# Patient Record
Sex: FEMALE | Race: WHITE | NOT HISPANIC OR LATINO | Employment: OTHER | URBAN - METROPOLITAN AREA
[De-identification: names, ages, dates, MRNs, and addresses within clinical notes are randomized per-mention and may not be internally consistent; named-entity substitution may affect disease eponyms.]

---

## 2021-12-05 ENCOUNTER — HOSPITAL ENCOUNTER (OUTPATIENT)
Dept: INFUSION THERAPY | Facility: HOSPITAL | Age: 46
Discharge: HOME | End: 2021-12-05
Admitting: PHYSICIAN ASSISTANT

## 2021-12-05 VITALS
SYSTOLIC BLOOD PRESSURE: 134 MMHG | DIASTOLIC BLOOD PRESSURE: 79 MMHG | OXYGEN SATURATION: 98 % | HEART RATE: 70 BPM | RESPIRATION RATE: 16 BRPM | TEMPERATURE: 97.4 F

## 2021-12-05 DIAGNOSIS — U07.1 CLINICAL DIAGNOSIS OF SEVERE ACUTE RESPIRATORY SYNDROME CORONAVIRUS 2 (SARS-COV-2) DISEASE: Primary | ICD-10-CM

## 2021-12-05 PROCEDURE — 25010000002 INJECTION, CASIRIVIMAB AND IMDEVIMAB, 1200 MG: Performed by: PHYSICIAN ASSISTANT

## 2021-12-05 PROCEDURE — M0243 CASIRIVI AND IMDEVI INFUSION: HCPCS | Performed by: PHYSICIAN ASSISTANT

## 2021-12-05 PROCEDURE — 96361 HYDRATE IV INFUSION ADD-ON: CPT

## 2021-12-05 PROCEDURE — 96365 THER/PROPH/DIAG IV INF INIT: CPT

## 2021-12-05 RX ORDER — DIPHENHYDRAMINE HYDROCHLORIDE 50 MG/ML
50 INJECTION INTRAMUSCULAR; INTRAVENOUS ONCE AS NEEDED
Status: CANCELLED | OUTPATIENT
Start: 2021-12-05

## 2021-12-05 RX ORDER — EPINEPHRINE 1 MG/ML
0.3 INJECTION, SOLUTION, CONCENTRATE INTRAVENOUS AS NEEDED
Status: DISCONTINUED | OUTPATIENT
Start: 2021-12-05 | End: 2021-12-07 | Stop reason: HOSPADM

## 2021-12-05 RX ORDER — METHYLPREDNISOLONE SODIUM SUCCINATE 125 MG/2ML
125 INJECTION, POWDER, LYOPHILIZED, FOR SOLUTION INTRAMUSCULAR; INTRAVENOUS AS NEEDED
Status: CANCELLED | OUTPATIENT
Start: 2021-12-05

## 2021-12-05 RX ORDER — SODIUM CHLORIDE 9 MG/ML
30 INJECTION, SOLUTION INTRAVENOUS ONCE
Status: COMPLETED | OUTPATIENT
Start: 2021-12-05 | End: 2021-12-05

## 2021-12-05 RX ORDER — DIPHENHYDRAMINE HCL 25 MG
50 TABLET ORAL ONCE AS NEEDED
Status: CANCELLED | OUTPATIENT
Start: 2021-12-05

## 2021-12-05 RX ORDER — DIPHENHYDRAMINE HCL 25 MG
50 CAPSULE ORAL ONCE AS NEEDED
Status: CANCELLED | OUTPATIENT
Start: 2021-12-05

## 2021-12-05 RX ORDER — DIPHENHYDRAMINE HCL 25 MG
50 CAPSULE ORAL ONCE AS NEEDED
Status: DISCONTINUED | OUTPATIENT
Start: 2021-12-05 | End: 2021-12-07 | Stop reason: HOSPADM

## 2021-12-05 RX ORDER — DIPHENHYDRAMINE HYDROCHLORIDE 50 MG/ML
50 INJECTION INTRAMUSCULAR; INTRAVENOUS ONCE AS NEEDED
Status: DISCONTINUED | OUTPATIENT
Start: 2021-12-05 | End: 2021-12-07 | Stop reason: HOSPADM

## 2021-12-05 RX ORDER — IBUPROFEN 200 MG
800 TABLET ORAL DAILY
COMMUNITY
End: 2023-02-20

## 2021-12-05 RX ORDER — EPINEPHRINE 1 MG/ML
0.3 INJECTION, SOLUTION, CONCENTRATE INTRAVENOUS AS NEEDED
Status: CANCELLED | OUTPATIENT
Start: 2021-12-05

## 2021-12-05 RX ORDER — FLUTICASONE PROPIONATE 50 MCG
2 SPRAY, SUSPENSION (ML) NASAL DAILY
COMMUNITY

## 2021-12-05 RX ORDER — SODIUM CHLORIDE 9 MG/ML
30 INJECTION, SOLUTION INTRAVENOUS ONCE
Status: CANCELLED | OUTPATIENT
Start: 2021-12-05

## 2021-12-05 RX ORDER — CETIRIZINE HYDROCHLORIDE 10 MG/1
10 TABLET ORAL DAILY
COMMUNITY

## 2021-12-05 RX ORDER — EPINEPHRINE 1 MG/ML
0.3 INJECTION, SOLUTION INTRAMUSCULAR; SUBCUTANEOUS AS NEEDED
Status: CANCELLED | OUTPATIENT
Start: 2021-12-05

## 2021-12-05 RX ORDER — METHYLPREDNISOLONE SODIUM SUCCINATE 125 MG/2ML
125 INJECTION, POWDER, LYOPHILIZED, FOR SOLUTION INTRAMUSCULAR; INTRAVENOUS AS NEEDED
Status: DISCONTINUED | OUTPATIENT
Start: 2021-12-05 | End: 2021-12-07 | Stop reason: HOSPADM

## 2021-12-05 RX ADMIN — CASIRIVIMAB AND IMDEVIMAB 1200 MG: 600; 600 INJECTION, SOLUTION, CONCENTRATE INTRAVENOUS at 12:17

## 2021-12-05 RX ADMIN — SODIUM CHLORIDE 100 ML: 9 INJECTION, SOLUTION INTRAVENOUS at 12:39

## 2021-12-11 ENCOUNTER — HOSPITAL ENCOUNTER (EMERGENCY)
Facility: HOSPITAL | Age: 46
Discharge: HOME | End: 2021-12-11
Attending: EMERGENCY MEDICINE | Admitting: EMERGENCY MEDICINE

## 2021-12-11 VITALS
HEIGHT: 72 IN | OXYGEN SATURATION: 98 % | WEIGHT: 276.46 LBS | DIASTOLIC BLOOD PRESSURE: 68 MMHG | RESPIRATION RATE: 22 BRPM | HEART RATE: 83 BPM | BODY MASS INDEX: 37.45 KG/M2 | TEMPERATURE: 96.8 F | SYSTOLIC BLOOD PRESSURE: 132 MMHG

## 2021-12-11 DIAGNOSIS — S39.012A STRAIN OF LUMBAR REGION, INITIAL ENCOUNTER: Primary | ICD-10-CM

## 2021-12-11 LAB
BILIRUB UR QL STRIP: ABNORMAL
CLARITY UR: ABNORMAL
COLOR UR: ABNORMAL
GLUCOSE UR STRIP-MCNC: NEGATIVE MG/DL
HGB UR QL STRIP.AUTO: NEGATIVE
KETONES UR QL STRIP: ABNORMAL
LEUKOCYTE ESTERASE UR QL STRIP.AUTO: NEGATIVE
NITRITE UR QL STRIP: NEGATIVE
PH UR STRIP.AUTO: 5.5 [PH] (ref 5–8)
PROT UR QL STRIP: ABNORMAL
SP GR UR STRIP: 1.03 (ref 1–1.03)
UROBILINOGEN UR QL STRIP: ABNORMAL

## 2021-12-11 PROCEDURE — 81003 URINALYSIS AUTO W/O SCOPE: CPT | Performed by: EMERGENCY MEDICINE

## 2021-12-11 PROCEDURE — 99283 EMERGENCY DEPT VISIT LOW MDM: CPT

## 2021-12-11 RX ORDER — CYCLOBENZAPRINE HCL 10 MG
10 TABLET ORAL 3 TIMES DAILY PRN
Qty: 15 TABLET | Refills: 0 | Status: SHIPPED | OUTPATIENT
Start: 2021-12-11 | End: 2023-02-20

## 2021-12-11 RX ORDER — NAPROXEN 375 MG/1
375 TABLET ORAL 2 TIMES DAILY PRN
Qty: 14 TABLET | Refills: 0 | Status: SHIPPED | OUTPATIENT
Start: 2021-12-11 | End: 2023-02-20

## 2021-12-11 NOTE — ED NOTES
Pt reports last night she began hurting underneath her left shoulder blade and today she is hurting every time she breathes. Pt has taken a breathing treatment (albuterol) and ibuprofen. Pt describes pain as stabbing pain and rates the pain 6/10.     Bethany English RN  12/11/21 0125

## 2021-12-11 NOTE — ED NOTES
Pt reports she was tested positive for COVID last Wednesday but her symptoms began 11/30/2021. Pt received regeneron last Sunday.      Bethany English RN  12/11/21 2745

## 2021-12-11 NOTE — ED PROVIDER NOTES
Subjective   Patient is a 46-year-old female complaining of left-sided back pain.  This developed over the past 24 hours.  The pain is worse in certain motions and positions.  She denies recent trauma or other complaint          Review of Systems  Negative cough fever chest pain shortness of breath vomiting diarrhea dysuria or other complaint  Past Medical History:   Diagnosis Date   • Closed right ankle fracture 2019    no surgery   • COVID 11/2021   • Elbow fracture, right     no surgery   • Left patella fracture     as a kid;no surgery       No Known Allergies    Past Surgical History:   Procedure Laterality Date   • NO PAST SURGERIES         Family History   Problem Relation Age of Onset   • Hypertension Mother    • Hypertension Father    • Diabetes Father        Social History     Socioeconomic History   • Marital status:    Tobacco Use   • Smoking status: Current Every Day Smoker     Years: 26.00     Types: Cigarettes   • Smokeless tobacco: Never Used   Vaping Use   • Vaping Use: Never used   Substance and Sexual Activity   • Alcohol use: Yes     Comment: socially all of the above   • Drug use: Never   • Sexual activity: Defer           Objective   Physical Exam  Lungs are clear.  Heart has regular rhythm without murmur gallop.  Chest is nontender.  Abdomen soft nontender.  Back has pain to palpation over her left flank.  She has no spinous tenderness.  She has full range of motion with pain.  Neurologic exam is nonfocal.  Procedures           ED Course      Results for orders placed or performed during the hospital encounter of 12/11/21   Urinalysis With Microscopic If Indicated (No Culture) - Urine, Clean Catch    Specimen: Urine, Clean Catch   Result Value Ref Range    Color, UA Dark Yellow (A) Yellow, Straw    Appearance, UA Hazy (A) Clear    pH, UA 5.5 5.0 - 8.0    Specific Gravity, UA 1.034 (H) 1.005 - 1.030    Glucose, UA Negative Negative    Ketones, UA Trace (A) Negative    Bilirubin, UA Small  (1+) (A) Negative    Blood, UA Negative Negative    Protein, UA Trace (A) Negative    Leuk Esterase, UA Negative Negative    Nitrite, UA Negative Negative    Urobilinogen, UA 0.2 E.U./dL 0.2 - 1.0 E.U./dL                                                MDM  Number of Diagnoses or Management Options  Diagnosis management comments: Patient has findings consistent with back strain.  Patient will be discharged with a prescription for Naprosyn and Flexeril.  Use a heating pad.  She will see MD for recheck as needed.       Amount and/or Complexity of Data Reviewed  Clinical lab tests: reviewed    Risk of Complications, Morbidity, and/or Mortality  Presenting problems: moderate  Diagnostic procedures: moderate  Management options: moderate    Patient Progress  Patient progress: stable      Final diagnoses:   Strain of lumbar region, initial encounter       ED Disposition  ED Disposition     ED Disposition Condition Comment    Discharge Stable           No follow-up provider specified.       Medication List      New Prescriptions    cyclobenzaprine 10 MG tablet  Commonly known as: FLEXERIL  Take 1 tablet by mouth 3 (Three) Times a Day As Needed for Muscle Spasms for up to 15 doses.     naproxen 375 MG tablet  Commonly known as: NAPROSYN  Take 1 tablet by mouth 2 (Two) Times a Day As Needed for Moderate Pain .           Where to Get Your Medications      These medications were sent to [facility]/pharmacy #[id number] - [facility] [place] - [address] [facility] [phone] [place] [phone]   [address] [facility] [place] [address]    Hours: 24-hours Phone: [phone]   · cyclobenzaprine 10 MG tablet  · naproxen 375 MG tablet          Ravin Kline MD  12/11/21 [id number]

## 2022-09-26 DIAGNOSIS — G47.33 SEVERE OBSTRUCTIVE SLEEP APNEA: Primary | ICD-10-CM

## 2023-01-10 ENCOUNTER — OFFICE VISIT (OUTPATIENT)
Dept: CARDIOLOGY | Facility: CLINIC | Age: 48
End: 2023-01-10
Payer: MEDICAID

## 2023-01-10 VITALS
HEIGHT: 72 IN | WEIGHT: 284 LBS | SYSTOLIC BLOOD PRESSURE: 138 MMHG | OXYGEN SATURATION: 98 % | HEART RATE: 82 BPM | BODY MASS INDEX: 38.47 KG/M2 | DIASTOLIC BLOOD PRESSURE: 73 MMHG

## 2023-01-10 DIAGNOSIS — G47.33 OBSTRUCTIVE SLEEP APNEA: ICD-10-CM

## 2023-01-10 DIAGNOSIS — E78.00 PURE HYPERCHOLESTEROLEMIA: ICD-10-CM

## 2023-01-10 DIAGNOSIS — E11.9 TYPE 2 DIABETES MELLITUS WITHOUT COMPLICATION, WITHOUT LONG-TERM CURRENT USE OF INSULIN: ICD-10-CM

## 2023-01-10 DIAGNOSIS — R07.2 PRECORDIAL PAIN: Primary | ICD-10-CM

## 2023-01-10 DIAGNOSIS — I10 PRIMARY HYPERTENSION: ICD-10-CM

## 2023-01-10 PROCEDURE — 99204 OFFICE O/P NEW MOD 45 MIN: CPT | Performed by: INTERNAL MEDICINE

## 2023-01-10 PROCEDURE — 93000 ELECTROCARDIOGRAM COMPLETE: CPT | Performed by: INTERNAL MEDICINE

## 2023-01-10 RX ORDER — METOPROLOL SUCCINATE 25 MG/1
25 TABLET, EXTENDED RELEASE ORAL DAILY
COMMUNITY

## 2023-01-10 RX ORDER — HYDROCHLOROTHIAZIDE 12.5 MG/1
12.5 CAPSULE, GELATIN COATED ORAL DAILY
COMMUNITY
End: 2023-02-20

## 2023-01-10 RX ORDER — ATORVASTATIN CALCIUM 10 MG/1
10 TABLET, FILM COATED ORAL DAILY
COMMUNITY

## 2023-01-10 NOTE — PROGRESS NOTES
Subjective:     Encounter Date:01/10/2023      Patient ID: Мария Fajardo is a 47 y.o. female.    Chief Complaint:  History of Present Illness 47-year-old white female with newly diagnosed hypertension hyperlipidemia and diabetes and sleep apnea presents to my office for a new consultation.  Patient has been having symptoms of chest pain which she describes as a sharp pain occurring on the left side of the chest without radiation.  No complains of any shortness of breath PND orthopnea.  No palpitation dizziness syncope she has no swelling of the feet.  She is taking her meds regular but she is beginning to exercise and follow a good diet.  She does not smoke anymore.  She has family history of coronary disease.  /73   Pulse 82   Ht 182.9 cm (72.01\")   Wt 129 kg (284 lb)   SpO2 98%   BMI 38.51 kg/m²     The following portions of the patient's history were reviewed and updated as appropriate: allergies, current medications, past family history, past medical history, past social history, past surgical history and problem list.  Past Medical History:   Diagnosis Date   • Closed right ankle fracture 2019    no surgery   • COVID 11/2021   • Elbow fracture, right     no surgery   • Hyperlipidemia December 2022   • Hypertension Recently started taking medications   • Left patella fracture     as a kid;no surgery   • Sleep apnea July 2022     Past Surgical History:   Procedure Laterality Date   • NO PAST SURGERIES       Social History     Socioeconomic History   • Marital status:    Tobacco Use   • Smoking status: Former     Packs/day: 0.25     Years: 26.00     Pack years: 6.50     Types: Cigarettes   • Smokeless tobacco: Never   • Tobacco comments:     Social drinker/smoker   Vaping Use   • Vaping Use: Never used   Substance and Sexual Activity   • Alcohol use: Yes     Alcohol/week: 4.0 standard drinks     Types: 2 Cans of beer, 2 Shots of liquor per week     Comment: socially all of the above   • Drug  use: Never   • Sexual activity: Yes     Partners: Male     Birth control/protection: None     Family History   Problem Relation Age of Onset   • Hypertension Mother         High Blood Pressure   • Hypertension Father         High Blood Pressure   • Diabetes Father    • Heart disease Father    • Fainting Sister        Current Outpatient Medications:   •  atorvastatin (LIPITOR) 10 MG tablet, Take 10 mg by mouth Daily., Disp: , Rfl:   •  cetirizine (zyrTEC) 10 MG tablet, Take 10 mg by mouth Daily., Disp: , Rfl:   •  fluticasone (FLONASE) 50 MCG/ACT nasal spray, 2 sprays into the nostril(s) as directed by provider Daily., Disp: , Rfl:   •  hydroCHLOROthiazide (MICROZIDE) 12.5 MG capsule, Take 12.5 mg by mouth Daily., Disp: , Rfl:   •  ibuprofen (ADVIL,MOTRIN) 200 MG tablet, Take 800 mg by mouth Daily., Disp: , Rfl:   •  metFORMIN (GLUCOPHAGE) 500 MG tablet, Take 500 mg by mouth 2 (Two) Times a Day With Meals., Disp: , Rfl:   •  metoprolol succinate XL (TOPROL-XL) 25 MG 24 hr tablet, Take 25 mg by mouth Daily., Disp: , Rfl:   •  naproxen (NAPROSYN) 375 MG tablet, Take 1 tablet by mouth 2 (Two) Times a Day As Needed for Moderate Pain ., Disp: 14 tablet, Rfl: 0  •  cyclobenzaprine (FLEXERIL) 10 MG tablet, Take 1 tablet by mouth 3 (Three) Times a Day As Needed for Muscle Spasms for up to 15 doses., Disp: 15 tablet, Rfl: 0  No Known Allergies    Review of Systems   Constitutional: Negative for malaise/fatigue.   Cardiovascular: Positive for chest pain. Negative for dyspnea on exertion, leg swelling and palpitations.   Respiratory: Negative for cough and shortness of breath.    Gastrointestinal: Negative for abdominal pain, nausea and vomiting.   Neurological: Negative for dizziness, focal weakness, headaches, light-headedness and numbness.   All other systems reviewed and are negative.             Objective:     Constitutional:       Appearance: Well-developed.   Eyes:      General: No scleral icterus.      Conjunctiva/sclera: Conjunctivae normal.      Pupils: Pupils are equal, round, and reactive to light.   HENT:      Head: Normocephalic and atraumatic.   Neck:      Vascular: No carotid bruit or JVD.   Pulmonary:      Effort: Pulmonary effort is normal.      Breath sounds: Normal breath sounds. No wheezing. No rales.   Cardiovascular:      Normal rate. Regular rhythm.   Pulses:     Intact distal pulses.   Abdominal:      General: Bowel sounds are normal.      Palpations: Abdomen is soft.   Musculoskeletal: Normal range of motion.      Cervical back: Normal range of motion and neck supple. Skin:     General: Skin is warm and dry.      Findings: No rash.   Neurological:      Mental Status: Alert.      Comments: No focal deficits           ECG 12 Lead    Date/Time: 1/10/2023 12:55 PM  Performed by: Chencho Up MD  Authorized by: Chencho Up MD   Comments: Normal sinus rhythm  Normal EKG  No previous ECGs available            Lab Review:       Assessment:          Diagnosis Plan   1. Precordial pain        2. Primary hypertension        3. Pure hypercholesterolemia        4. Type 2 diabetes mellitus without complication, without long-term current use of insulin (HCC)        5. Obstructive sleep apnea               Plan:     Patient presented with chest pain which is atypical for angina.  Patient has an EKG which is normal  However patient has significant risk factors including hypertension hyperlipidemia diabetes and previous smoker and family stay of coronary disease  Patient will have an echocardiogram for LV function valvular abnormalities  We will also have a treadmill stress test to rule out any ischemia  Patient is also advised to stop smoking and not restarted and also exercise regularly  Patient's blood pressure Norman stable.

## 2023-01-23 ENCOUNTER — OFFICE VISIT (OUTPATIENT)
Dept: PODIATRY | Facility: CLINIC | Age: 48
End: 2023-01-23
Payer: MEDICAID

## 2023-01-23 VITALS — HEIGHT: 72 IN | BODY MASS INDEX: 38.47 KG/M2 | HEART RATE: 79 BPM | OXYGEN SATURATION: 98 % | WEIGHT: 284 LBS

## 2023-01-23 DIAGNOSIS — M21.6X2 ACQUIRED EQUINUS DEFORMITY OF BOTH FEET: ICD-10-CM

## 2023-01-23 DIAGNOSIS — M79.671 BILATERAL FOOT PAIN: Primary | ICD-10-CM

## 2023-01-23 DIAGNOSIS — M72.2 PLANTAR FASCIITIS, BILATERAL: ICD-10-CM

## 2023-01-23 DIAGNOSIS — M79.672 BILATERAL FOOT PAIN: Primary | ICD-10-CM

## 2023-01-23 DIAGNOSIS — M21.6X1 ACQUIRED EQUINUS DEFORMITY OF BOTH FEET: ICD-10-CM

## 2023-01-23 PROCEDURE — 20550 NJX 1 TENDON SHEATH/LIGAMENT: CPT | Performed by: PODIATRIST

## 2023-01-23 PROCEDURE — 76942 ECHO GUIDE FOR BIOPSY: CPT | Performed by: PODIATRIST

## 2023-01-23 PROCEDURE — 99203 OFFICE O/P NEW LOW 30 MIN: CPT | Performed by: PODIATRIST

## 2023-01-23 RX ORDER — TRIAMCINOLONE ACETONIDE 40 MG/ML
20 INJECTION, SUSPENSION INTRA-ARTICULAR; INTRAMUSCULAR ONCE
Status: COMPLETED | OUTPATIENT
Start: 2023-01-23 | End: 2023-01-23

## 2023-01-23 RX ADMIN — TRIAMCINOLONE ACETONIDE 20 MG: 40 INJECTION, SUSPENSION INTRA-ARTICULAR; INTRAMUSCULAR at 09:59

## 2023-01-23 NOTE — PROGRESS NOTES
01/23/2023  Foot and Ankle Surgery - New Patient   Provider: Dr. Rigoberto Hess DPM  Location: [facility]    Subjective:  Мария Fajardo is a 47 y.o. female.     Chief Complaint   Patient presents with   • Left Foot - Pain   • Right Foot - Pain, Heel Spurs   • Initial Evaluation     MARI Matson last seen approx. 1 month ago       HPI: The patient is a 47-year-old female who presents to the clinic for bilateral foot pain.    She reports her right foot is worse than her left foot. The patient states she has had this issue for approximately 2 years. She notes she saw [facility] approximately 1 year ago and received a series of cortisone injections. They removed an ingrown toenail and her toenail fell off. She is concerned about heel spurs. The patient denies any known injuries; however, states she has fallen and fractured her foot a few times. She notes she was given a brace to wear; however, she was not happy with their service. The patient reports she has had to crawl to the bathroom due to the pain. She states her pain is relieved if she takes the pressure off her feet. 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 She confirms that she is on her feet all day. The patient notes she would like to try conservative treatment at this time. She reports that she went to [employer], and purchased PowerStep insoles. The patient notes that she usually wears her boots instead of her tennis shoes.     The patient denies being referred.    No Known Allergies    Past Medical History:   Diagnosis Date   • Closed right ankle fracture 2019    no surgery   • COVID 11/2021   • Elbow fracture, right     no surgery   • Hyperlipidemia December 2022   • Hypertension Recently started taking medications   • Left patella fracture     as a kid;no surgery   • Sleep apnea July 2022       Past Surgical History:   Procedure Laterality Date   • NO PAST SURGERIES          Family History   Problem Relation Age of Onset   • Hypertension Mother         High Blood Pressure   • Hypertension Father         High Blood Pressure   • Diabetes Father    • Heart disease Father    • Fainting Sister        Social History     Socioeconomic History   • Marital status:    Tobacco Use   • Smoking status: Former     Packs/day: 0.25     Years: 26.00     Pack years: 6.50     Types: Cigarettes   • Smokeless tobacco: Never   • Tobacco comments:     Social drinker/smoker   Vaping Use   • Vaping Use: Never used   Substance and Sexual Activity   • Alcohol use: Yes     Alcohol/week: 4.0 standard drinks     Types: 2 Cans of beer, 2 Shots of liquor per week     Comment: socially all of the above   • Drug use: Never   • Sexual activity: Yes     Partners: Male     Birth control/protection: None        Current Outpatient Medications on File Prior to Visit   Medication Sig Dispense Refill   • atorvastatin (LIPITOR) 10 MG tablet Take 10 mg by mouth Daily.     • cetirizine (zyrTEC) 10 MG tablet Take 10 mg by mouth Daily.     • fluticasone (FLONASE) 50 MCG/ACT nasal spray 2 sprays into the nostril(s) as directed by provider Daily.     • hydroCHLOROthiazide (MICROZIDE) 12.5 MG capsule Take 12.5 mg by mouth Daily.     • ibuprofen (ADVIL,MOTRIN) 200 MG tablet Take 800 mg by mouth Daily.     • metFORMIN (GLUCOPHAGE) 500 MG tablet Take 500 mg by mouth 2 (Two) Times a Day With Meals.     • metoprolol succinate XL (TOPROL-XL) 25 MG 24 hr tablet Take 25 mg by mouth Daily.     • cyclobenzaprine (FLEXERIL) 10 MG tablet Take 1 tablet by mouth 3 (Three) Times a Day As Needed for Muscle Spasms for up to 15 doses. 15 tablet 0   • naproxen (NAPROSYN) 375 MG tablet Take 1 tablet by mouth 2 (Two) Times a Day As Needed for Moderate Pain . 14 tablet 0     No current facility-administered medications on file prior to visit.       Review of Systems:  General: Denies fever, chills, fatigue, and weakness.  Eyes: Denies vision  "loss, blurry vision, and excessive redness.  ENT: Denies hearing issues and difficulty swallowing.  Cardiovascular: Denies palpitations, chest pain, or syncopal episodes.  Respiratory: Denies shortness of breath, wheezing, and coughing.  GI: Denies abdominal pain, nausea, and vomiting.   : Denies frequency, hematuria, and urgency.  Musculoskeletal: Denies muscle cramps, joint pains, and stiffness.  Derm: Denies rash, open wounds, or suspicious lesions.  Neuro: Denies headaches, numbness, loss of coordination, and tremors.  Psych: Denies anxiety and depression.  Endocrine: Denies temperature intolerance and changes in appetite.  Heme: Denies bleeding disorders or abnormal bruising.     Objective   Pulse 79   Ht 182.9 cm (72\")   Wt 129 kg (284 lb)   SpO2 98%   BMI 38.52 kg/m²     Foot/Ankle Exam:       General:   Appearance: appears stated age and healthy    Orientation: AAOx3    Affect: appropriate      VASCULAR      Right Foot Vascularity   Normal vascular exam    Dorsalis pedis:  2+  Posterior tibial:  2+  Skin Temperature: warm    Edema Grading:  None  CFT:  < 3 seconds  Pedal Hair Growth:  Present  Varicosities: none       Left Foot Vascularity   Normal vascular exam    Dorsalis pedis:  2+  Posterior tibial:  2+  Skin Temperature: warm    Edema Grading:  None  CFT:  < 3 seconds  Pedal Hair Growth:  Present  Varicosities: none        NEUROLOGIC     Right Foot Neurologic   Light touch sensation:  Normal  Hot/Cold sensation: normal    Achilles reflex:  2+     Left Foot Neurologic   Light touch sensation:  Normal  Hot/cold sensation: normal    Achilles reflex:  2+     MUSCULOSKELETAL      Right Foot Musculoskeletal   Ecchymosis:  None  Arch:  Normal     Left Foot Musculoskeletal   Ecchymosis:  None  Arch:  Normal     MUSCLE STRENGTH     Right Foot Muscle Strength   Normal strength    Foot dorsiflexion:  5  Foot plantar flexion:  5  Foot inversion:  5  Foot eversion:  5     Left Foot Muscle Strength   Normal " strength    Foot dorsiflexion:  5  Foot plantar flexion:  5  Foot inversion:  5  Foot eversion:  5     DERMATOLOGIC     Right Foot Dermatologic   Skin: skin intact    Nails comment:  Nails 1-5     Left Foot Dermatologic   Skin: skin intact    Nails comment:  Nails 1-5     TESTS     Right Foot Tests   Anterior drawer: negative    Varus tilt: negative       Left Foot Tests   Anterior drawer: negative    Varus tilt: negative        Right Foot Additional Comments Moderate to significant discomfort with palpation involving the plantar medial calcaneal tuberosity, right greater than left. Moderate equinus contracture with knee extended and flexed. No gross deformity or instability.      Assessment & Plan   Diagnoses and all orders for this visit:    1. Bilateral foot pain (Primary)    2. Plantar fasciitis, bilateral  -     XR Foot 3+ View Bilateral  -     triamcinolone acetonide (KENALOG-40) injection 20 mg    3. Acquired equinus deformity of both feet  -     XR Foot 3+ View Bilateral  -     triamcinolone acetonide (KENALOG-40) injection 20 mg        Patient presents for bilateral foot pain. Imaging was independently reviewed showing heel spur. We discussed imaging, diagnosis, and treatment options at length. I have asked her to wear PowerStep inserts. I have advised her to wear tennis shoes every day. I have asked her to avoid ambulating while barefoot, as well as avoiding wearing flip flops, sandals, flats, or heels. I have asked her to perform stretching exercises 2 to 3 times daily. We discussed surgical intervention. The patient would like to proceed with bilateral plantar fascia injections today.    Greater than 30 minutes spent before, during, and after evaluation for patient care.    Plantar Fascia Steroid Injection: Bilateral feet    Consent and time out was performed before proceeding with the procedure. The area of maximal tenderness was palpated near the plantar medial calcaneal tuberosity of the bilateral  feet. The area was cleansed with alcohol. Under ultrasound guidance, the plantar fascia was visualized and a solution totaling 1.5 mL was injected about the origin of the plantar fascia. The solution contained 0.5 mL of 1% lidocaine plain, 0.5 mL of 0.5% Marcaine plain, and 0.5 mL of Kenalog. After the injection, the patient noted immediate pain relief. Mild compression was placed at the injection site followed by a sterile bandage. The patient tolerated the injection well without complication.    Orders Placed This Encounter   Procedures   • XR Foot 3+ View Bilateral     Order Specific Question:   Reason for Exam:     Answer:   horacio heel pain and heel spur in right foot       room 15      wb     Order Specific Question:   Patient Pregnant     Answer:   No     Order Specific Question:   Does this patient have a diabetic monitoring/medication delivering device on?     Answer:   No     Order Specific Question:   Release to patient     Answer:   Routine Release        Note is dictated utilizing voice recognition software. Unfortunately this leads to occasional typographical errors. I apologize in advance if the situation occurs. If questions occur please do not hesitate to call our office.    Transcribed from ambient dictation for JOSIE Hess DPM by Dorothy Jewell.  01/23/23   12:52 EST    Patient or patient representative verbalized consent to the visit recording.  I have personally performed the services described in this document as transcribed by the above individual, and it is both accurate and complete.

## 2023-01-25 ENCOUNTER — HOSPITAL ENCOUNTER (OUTPATIENT)
Dept: CARDIOLOGY | Facility: HOSPITAL | Age: 48
Discharge: HOME | End: 2023-01-25
Payer: MEDICAID

## 2023-01-25 DIAGNOSIS — E11.9 TYPE 2 DIABETES MELLITUS WITHOUT COMPLICATION, WITHOUT LONG-TERM CURRENT USE OF INSULIN: ICD-10-CM

## 2023-01-25 DIAGNOSIS — I10 PRIMARY HYPERTENSION: ICD-10-CM

## 2023-01-25 DIAGNOSIS — E78.00 PURE HYPERCHOLESTEROLEMIA: ICD-10-CM

## 2023-01-25 DIAGNOSIS — G47.33 OBSTRUCTIVE SLEEP APNEA: ICD-10-CM

## 2023-01-25 DIAGNOSIS — R07.2 PRECORDIAL PAIN: ICD-10-CM

## 2023-01-25 LAB
BH CV STRESS BP STAGE 1: NORMAL
BH CV STRESS BP STAGE 2: NORMAL
BH CV STRESS DURATION MIN STAGE 1: 3
BH CV STRESS DURATION MIN STAGE 2: 3
BH CV STRESS DURATION MIN STAGE 3: 3
BH CV STRESS DURATION SEC STAGE 1: 0
BH CV STRESS DURATION SEC STAGE 2: 0
BH CV STRESS DURATION SEC STAGE 3: 0
BH CV STRESS GRADE STAGE 1: 10
BH CV STRESS GRADE STAGE 2: 12
BH CV STRESS GRADE STAGE 3: 14
BH CV STRESS HR STAGE 1: 111
BH CV STRESS HR STAGE 2: 127
BH CV STRESS HR STAGE 3: 146
BH CV STRESS METS STAGE 1: 5
BH CV STRESS METS STAGE 2: 7.5
BH CV STRESS METS STAGE 3: 10
BH CV STRESS PROTOCOL 1: NORMAL
BH CV STRESS RECOVERY BP: NORMAL MMHG
BH CV STRESS RECOVERY HR: 100 BPM
BH CV STRESS SPEED STAGE 1: 1.7
BH CV STRESS SPEED STAGE 2: 2.5
BH CV STRESS SPEED STAGE 3: 3.4
BH CV STRESS STAGE 1: 1
BH CV STRESS STAGE 2: 2
BH CV STRESS STAGE 3: 3
MAXIMAL PREDICTED HEART RATE: 172 BPM
STRESS BASELINE BP: NORMAL MMHG
STRESS BASELINE HR: 67 BPM
STRESS POST EXERCISE DUR MIN: 8 MIN
STRESS POST EXERCISE DUR SEC: 2 SEC
STRESS TARGET HR: 146 BPM

## 2023-01-25 PROCEDURE — 93017 CV STRESS TEST TRACING ONLY: CPT

## 2023-01-25 PROCEDURE — 93306 TTE W/DOPPLER COMPLETE: CPT | Performed by: INTERNAL MEDICINE

## 2023-01-25 PROCEDURE — 93018 CV STRESS TEST I&R ONLY: CPT | Performed by: INTERNAL MEDICINE

## 2023-01-25 PROCEDURE — 93306 TTE W/DOPPLER COMPLETE: CPT

## 2023-01-25 PROCEDURE — 93016 CV STRESS TEST SUPVJ ONLY: CPT | Performed by: NURSE PRACTITIONER

## 2023-01-26 LAB
BH CV ECHO MEAS - ACS: 2.26 CM
BH CV ECHO MEAS - AO MAX PG: 8.4 MMHG
BH CV ECHO MEAS - AO MEAN PG: 4 MMHG
BH CV ECHO MEAS - AO ROOT DIAM: 3.4 CM
BH CV ECHO MEAS - AO V2 MAX: 144.5 CM/SEC
BH CV ECHO MEAS - AO V2 VTI: 27.6 CM
BH CV ECHO MEAS - AVA(I,D): 2.7 CM2
BH CV ECHO MEAS - EDV(CUBED): 91.9 ML
BH CV ECHO MEAS - EDV(MOD-SP4): 119.5 ML
BH CV ECHO MEAS - EF(MOD-SP4): 52.5 %
BH CV ECHO MEAS - ESV(CUBED): 32.5 ML
BH CV ECHO MEAS - ESV(MOD-SP4): 56.8 ML
BH CV ECHO MEAS - FS: 29.2 %
BH CV ECHO MEAS - IVS/LVPW: 0.86 CM
BH CV ECHO MEAS - IVSD: 0.86 CM
BH CV ECHO MEAS - LA DIMENSION: 3.7 CM
BH CV ECHO MEAS - LV DIASTOLIC VOL/BSA (35-75): 48.3 CM2
BH CV ECHO MEAS - LV MASS(C)D: 140.3 GRAMS
BH CV ECHO MEAS - LV MAX PG: 4.9 MMHG
BH CV ECHO MEAS - LV MEAN PG: 2.25 MMHG
BH CV ECHO MEAS - LV SYSTOLIC VOL/BSA (12-30): 23 CM2
BH CV ECHO MEAS - LV V1 MAX: 110.1 CM/SEC
BH CV ECHO MEAS - LV V1 VTI: 22 CM
BH CV ECHO MEAS - LVIDD: 4.5 CM
BH CV ECHO MEAS - LVIDS: 3.2 CM
BH CV ECHO MEAS - LVOT AREA: 3.4 CM2
BH CV ECHO MEAS - LVOT DIAM: 2.07 CM
BH CV ECHO MEAS - LVPWD: 1.01 CM
BH CV ECHO MEAS - MV A MAX VEL: 94.7 CM/SEC
BH CV ECHO MEAS - MV DEC SLOPE: 326.2 CM/SEC2
BH CV ECHO MEAS - MV DEC TIME: 0.27 MSEC
BH CV ECHO MEAS - MV E MAX VEL: 86.6 CM/SEC
BH CV ECHO MEAS - MV E/A: 0.91
BH CV ECHO MEAS - MV MAX PG: 4 MMHG
BH CV ECHO MEAS - MV MEAN PG: 1.77 MMHG
BH CV ECHO MEAS - MV V2 VTI: 26.6 CM
BH CV ECHO MEAS - MVA(VTI): 2.8 CM2
BH CV ECHO MEAS - PA ACC TIME: 0.14 SEC
BH CV ECHO MEAS - PA PR(ACCEL): 16.7 MMHG
BH CV ECHO MEAS - PULM A REVS DUR: 0.1 SEC
BH CV ECHO MEAS - PULM A REVS VEL: 32.7 CM/SEC
BH CV ECHO MEAS - PULM DIAS VEL: 50.8 CM/SEC
BH CV ECHO MEAS - PULM S/D: 1.22
BH CV ECHO MEAS - PULM SYS VEL: 61.8 CM/SEC
BH CV ECHO MEAS - RAP SYSTOLE: 8 MMHG
BH CV ECHO MEAS - RV MAX PG: 1.78 MMHG
BH CV ECHO MEAS - RV V1 MAX: 66.7 CM/SEC
BH CV ECHO MEAS - RV V1 VTI: 16 CM
BH CV ECHO MEAS - RVDD: 3.5 CM
BH CV ECHO MEAS - SI(MOD-SP4): 25.4 ML/M2
BH CV ECHO MEAS - SV(LVOT): 74 ML
BH CV ECHO MEAS - SV(MOD-SP4): 62.8 ML
MAXIMAL PREDICTED HEART RATE: 172 BPM
STRESS TARGET HR: 146 BPM

## 2023-01-27 ENCOUNTER — PATIENT ROUNDING (BHMG ONLY) (OUTPATIENT)
Dept: PODIATRY | Facility: CLINIC | Age: 48
End: 2023-01-27
Payer: COMMERCIAL

## 2023-02-14 NOTE — PROGRESS NOTES
"Chief Complaint  NEW PATIENT (COTY)    Subjective          Мария Fajardo presents to [facility] NEUROLOG[facility]  History of Present Illness     New COTY patient   Pt presented with snoring and difficulty maintaining sleep  On hst pt found to have severe coty   currently using a cpap machine,she states she is not benefiting from pap therapy due to she wakes up suffocating ,  she uses [facility] goes through [facility] for supplies    Sleep testing history:    On NPSG at  SLEEP STUDY , 7/27/22 patient had Severe obstructive sleep apnea syndrome with apnea-hypopnea index of 53.9 per sleep hour, minimum SpO2 of 80%    PAP download:  The patient is on CPAP therapy at 6-16 cm/H2O.   avg pressue 8.6 with ahi of 5  used on only 4 or 5 nights     The patient's hypersomnia has resolved       [place] Sleepiness Scale:  Sitting and reading 2 WatchingTV 3  Sitting, inactive, in a public place 1  As a passenger in a car for 1 hour w/o a break  1  Lying down to rest in the afternoon  2  Sitting and talking to someone  1  Sitting quietly after a lunch  0  In a car, while stopped for traffic or a light  0  Total 10      Review of Systems   HENT: Positive for tinnitus.    Respiratory: Positive for apnea.    Cardiovascular: Positive for leg swelling.   Neurological: Positive for headaches.         Objective   Vital Signs:   /83   Pulse 68   Temp 99.8 °F (37.7 °C) (Infrared)   Resp 18   Ht 182.9 cm (72\")   Wt 126 kg (277 lb)   BMI 37.57 kg/m²     Physical Exam  Vitals reviewed.   Constitutional:       Appearance: Normal appearance.   HENT:      Head: Normocephalic.      Nose: Nose normal.      Mouth/Throat:      Mouth: Mucous membranes are moist.      Pharynx: Oropharynx is clear.   Pulmonary:      Effort: Pulmonary effort is normal. No respiratory distress.   Neurological:      General: No focal deficit present.      Mental Status: She is alert and oriented to person, place, and time.      Motor: Motor function is " intact.      Gait: Gait is intact.   Psychiatric:         Mood and Affect: Mood normal.        Result Review :                 Assessment and Plan    Diagnoses and all orders for this visit:    1. COTY (obstructive sleep apnea) (Primary)      Severe coty on hst    Given difficulties recommend in lab study  Will give ambien to take night of the sleep test.      Follow Up   Return in about 3 months (around 5/20/2023).    Patient was given instructions and counseling regarding her condition or for health maintenance advice. Please see specific information pulled into the AVS if appropriate.       This document has been electronically signed by Joseph Seipel, MD on February 20, 2023 11:42 EST

## 2023-02-20 ENCOUNTER — OFFICE VISIT (OUTPATIENT)
Dept: NEUROLOGY | Facility: CLINIC | Age: 48
End: 2023-02-20
Payer: MEDICAID

## 2023-02-20 VITALS
BODY MASS INDEX: 37.52 KG/M2 | HEART RATE: 68 BPM | SYSTOLIC BLOOD PRESSURE: 145 MMHG | HEIGHT: 72 IN | WEIGHT: 277 LBS | TEMPERATURE: 99.8 F | DIASTOLIC BLOOD PRESSURE: 83 MMHG | RESPIRATION RATE: 18 BRPM

## 2023-02-20 DIAGNOSIS — G47.33 OSA (OBSTRUCTIVE SLEEP APNEA): Primary | ICD-10-CM

## 2023-02-20 PROBLEM — Z81.8 FAMILY HISTORY OF OTHER MENTAL AND BEHAVIORAL DISORDERS: Status: ACTIVE | Noted: 2023-02-20

## 2023-02-20 PROCEDURE — 99204 OFFICE O/P NEW MOD 45 MIN: CPT | Performed by: PSYCHIATRY & NEUROLOGY

## 2023-02-20 RX ORDER — ZOLPIDEM TARTRATE 5 MG/1
TABLET ORAL
Qty: 2 TABLET | Refills: 0 | OUTPATIENT
Start: 2023-02-20 | End: 2023-03-31

## 2023-02-20 RX ORDER — SEMAGLUTIDE 1.34 MG/ML
INJECTION, SOLUTION SUBCUTANEOUS
COMMUNITY
Start: 2023-01-24

## 2023-02-20 RX ORDER — HYDROCHLOROTHIAZIDE 25 MG/1
1 TABLET ORAL DAILY
COMMUNITY
Start: 2023-02-01

## 2023-03-21 ENCOUNTER — OFFICE VISIT (OUTPATIENT)
Dept: PODIATRY | Facility: CLINIC | Age: 48
End: 2023-03-21
Payer: COMMERCIAL

## 2023-03-21 VITALS — WEIGHT: 277 LBS | OXYGEN SATURATION: 97 % | BODY MASS INDEX: 37.52 KG/M2 | HEIGHT: 72 IN

## 2023-03-21 DIAGNOSIS — M72.2 PLANTAR FASCIITIS, BILATERAL: ICD-10-CM

## 2023-03-21 DIAGNOSIS — M21.6X1 ACQUIRED EQUINUS DEFORMITY OF BOTH FEET: ICD-10-CM

## 2023-03-21 DIAGNOSIS — M79.671 BILATERAL FOOT PAIN: Primary | ICD-10-CM

## 2023-03-21 DIAGNOSIS — M79.672 BILATERAL FOOT PAIN: Primary | ICD-10-CM

## 2023-03-21 DIAGNOSIS — M21.6X2 ACQUIRED EQUINUS DEFORMITY OF BOTH FEET: ICD-10-CM

## 2023-03-21 PROCEDURE — 99213 OFFICE O/P EST LOW 20 MIN: CPT | Performed by: PODIATRIST

## 2023-03-21 PROCEDURE — 1160F RVW MEDS BY RX/DR IN RCRD: CPT | Performed by: PODIATRIST

## 2023-03-21 PROCEDURE — 1159F MED LIST DOCD IN RCRD: CPT | Performed by: PODIATRIST

## 2023-03-21 NOTE — PROGRESS NOTES
"03/21/2023  Foot and Ankle Surgery - Established Patient/Follow-up  Provider: Dr. Rigoberto Hess DPM  Location: [facility]    Subjective:  Мария Fajardo is a 48 y.o. female.     Chief Complaint   Patient presents with   • Right Foot - Pain, Plantar Fasciitis, Heel Spurs   • Left Foot - Pain, Plantar Fasciitis, Heel Spurs   • Follow-up     JOSI Matson MD last seen approx 1 month date unknown        HPI: Patient returns for follow-up regarding bilateral foot pain after steroid injections and initial treatment for Planter fasciitis.  She has noticed significant improvement since last exam.  She feels 70% better overall.  No new issues or concerns.    No Known Allergies    Current Outpatient Medications on File Prior to Visit   Medication Sig Dispense Refill   • atorvastatin (LIPITOR) 10 MG tablet Take 1 tablet by mouth Daily.     • cetirizine (zyrTEC) 10 MG tablet Take 1 tablet by mouth Daily.     • fluticasone (FLONASE) 50 MCG/ACT nasal spray 2 sprays into the nostril(s) as directed by provider Daily.     • hydroCHLOROthiazide (HYDRODIURIL) 25 MG tablet Take 1 tablet by mouth Daily.     • metFORMIN (GLUCOPHAGE) 500 MG tablet Take 1 tablet by mouth 2 (Two) Times a Day With Meals.     • metoprolol succinate XL (TOPROL-XL) 25 MG 24 hr tablet Take 1 tablet by mouth Daily.     • Ozempic, 0.25 or 0.5 MG/DOSE, 2 MG/1.5ML solution pen-injector INJECT 0.25MG SUBCUTANEOUSLY WEEKLY FOR 4 WEEKS. FOR PREDIABETES/OBESITY     • zolpidem (Ambien) 5 MG tablet One po prior to sleep test, may repeat times one. 2 tablet 0     No current facility-administered medications on file prior to visit.       Objective   Ht 182.9 cm (72\")   Wt 126 kg (277 lb)   SpO2 97%   BMI 37.57 kg/m²     General:   Appearance: appears stated age and healthy    Orientation: AAOx3    Affect: appropriate       VASCULAR       Right Foot Vascularity   Normal vascular exam    Dorsalis pedis:  2+  Posterior tibial:  2+  Skin Temperature: warm    Edema " Grading:  None  CFT:  < 3 seconds  Pedal Hair Growth:  Present  Varicosities: none        Left Foot Vascularity   Normal vascular exam    Dorsalis pedis:  2+  Posterior tibial:  2+  Skin Temperature: warm    Edema Grading:  None  CFT:  < 3 seconds  Pedal Hair Growth:  Present  Varicosities: none        NEUROLOGIC      Right Foot Neurologic   Light touch sensation:  Normal  Hot/Cold sensation: normal    Achilles reflex:  2+      Left Foot Neurologic   Light touch sensation:  Normal  Hot/cold sensation: normal    Achilles reflex:  2+      MUSCULOSKELETAL       Right Foot Musculoskeletal   Ecchymosis:  None  Arch:  Normal      Left Foot Musculoskeletal   Ecchymosis:  None  Arch:  Normal      MUSCLE STRENGTH      Right Foot Muscle Strength   Normal strength    Foot dorsiflexion:  5  Foot plantar flexion:  5  Foot inversion:  5  Foot eversion:  5      Left Foot Muscle Strength   Normal strength    Foot dorsiflexion:  5  Foot plantar flexion:  5  Foot inversion:  5  Foot eversion:  5      DERMATOLOGIC      Right Foot Dermatologic   Skin: skin intact    Nails comment:  Nails 1-5      Left Foot Dermatologic   Skin: skin intact    Nails comment:  Nails 1-5      TESTS      Right Foot Tests   Anterior drawer: negative    Varus tilt: negative        Left Foot Tests   Anterior drawer: negative    Varus tilt: negative        Right Foot Additional Comments Moderate to significant discomfort with palpation involving the plantar medial calcaneal tuberosity, right greater than left. Moderate equinus contracture with knee extended and flexed. No gross deformity or instability.    3/21/23: Less discomfort with palpation involving the plantar aspect of the heels.  Continued equinus contracture involving both lower extremities    Assessment & Plan   Diagnoses and all orders for this visit:    1. Bilateral foot pain (Primary)    2. Plantar fasciitis, bilateral    3. Acquired equinus deformity of both feet      Patient is doing quite well  at this time.  She has responded nicely to conservative care and injection therapy.  I do feel that symptoms will continue to improve.  We did review the importance of continued support and stretching exercises.  We did review low impact exercises, RICE therapy, and proper use of OTC anti-inflammatories.  Patient has opted to see me on an as-needed basis.    No orders of the defined types were placed in this encounter.         Note is dictated utilizing voice recognition software. Unfortunately this leads to occasional typographical errors. I apologize in advance if the situation occurs. If questions occur please do not hesitate to call our office.

## 2023-08-28 NOTE — PROGRESS NOTES
Genetic Note    Мария Fajardo  1975    Primary Care Physician: Salome Mancuso APRN  Referring Physician: Salome Mancuso APRN      Reason For Visit: High Risk Evaluation For  Cancer      Chief Complaint   Patient presents with    Follow-up     Family history of Cancer       HPI    This is a 48 year old female with no history of cancer.  Patient is here today due to strong family history of cancer.  Recently her sister was diagnosed with breast cancer in her 50s.  Her sister also had comprehensive gene test which was essentially negative for any mutation.    Additional family cancer history includes maternal aunt with ovarian cancer in her 60s, maternal uncle with kidney cancer, paternal great grandfather with pancreatic cancer and paternal great grandmother with colon cancer.    Patient is up-to-date on her annual mammogram    She had her last mammogram in October 2022 at [facility].  She has history of dense breast tissue    No hx of breast bx  She has dense breast  Patient smokes 10 cigs per week  She does drink socially  She is nulliparous  Menrache  was at 10 years.  She is premenopausal     Patient is an     Patient smokes about 10 cigarettes/day and drinks alcohol socially    7/12/2023: Patient had comprehensive gene analysis with [employer] technology which returned with no evidence of germline mutation in all 77 genes analyzed.  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    Past Medical History:   Diagnosis Date    Asthma 10/28/2015    Closed right ankle fracture 2019    no surgery    COVID 11/2021    Elbow fracture, right     no surgery    Hyperlipidemia December 2022    Hypertension Recently started taking medications    Left patella fracture     as a kid;no surgery    Sleep apnea July 2022       Past Surgical History:   Procedure Laterality Date    NO PAST SURGERIES           Current Outpatient Medications:     atorvastatin (LIPITOR) 10  "MG tablet, Take 1 tablet by mouth Daily., Disp: , Rfl:     cetirizine (zyrTEC) 10 MG tablet, Take 1 tablet by mouth Daily., Disp: , Rfl:     cyclobenzaprine (FLEXERIL) 5 MG tablet, Take 1 tablet by mouth 3 (Three) Times a Day As Needed for Muscle Spasms., Disp: 15 tablet, Rfl: 0    fluticasone (FLONASE) 50 MCG/ACT nasal spray, 2 sprays into the nostril(s) as directed by provider Daily., Disp: , Rfl:     hydroCHLOROthiazide (HYDRODIURIL) 25 MG tablet, Take 1 tablet by mouth Daily., Disp: , Rfl:     Magnesium 100 MG capsule, Take  by mouth., Disp: , Rfl:     meloxicam (MOBIC) 15 MG tablet, Take 1 tablet by mouth Daily., Disp: 10 tablet, Rfl: 0    metFORMIN (GLUCOPHAGE) 500 MG tablet, Take 1 tablet by mouth 2 (Two) Times a Day With Meals., Disp: , Rfl:     metoprolol succinate XL (TOPROL-XL) 25 MG 24 hr tablet, Take 1 tablet by mouth Daily., Disp: , Rfl:     metroNIDAZOLE (METROGEL) 0.75 % vaginal gel, 1 (ONE) APPLICATOR NIGHTLY, ONE FULL APPLICATOR INTRAVAGINALLY EVERY DAY FOR 5 DAYS, Disp: , Rfl:     multivitamin with minerals (MULTIVITAMIN ADULT PO), Take 1 tablet by mouth Daily., Disp: , Rfl:     Ozempic, 0.25 or 0.5 MG/DOSE, 2 MG/1.5ML solution pen-injector, INJECT 0.25MG SUBCUTANEOUSLY WEEKLY FOR 4 WEEKS. FOR PREDIABETES/OBESITY, Disp: , Rfl:     pantoprazole (PROTONIX) 40 MG EC tablet, Take 1 tablet by mouth Daily., Disp: , Rfl:     Potassium 75 MG tablet, Take  by mouth As Needed. For \"leg cramps\", Disp: , Rfl:     Turmeric 500 MG capsule, Take  by mouth., Disp: , Rfl:     No Known Allergies    Family History   Problem Relation Age of Onset    Hypertension Mother         High Blood Pressure    Hypertension Father         High Blood Pressure    Diabetes Father     Heart disease Father     Fainting Sister     Breast cancer Sister     Ovarian cancer Maternal Aunt     Kidney cancer Maternal Uncle     Pancreatic cancer Paternal Uncle     Colon cancer Paternal Grandmother        Cancer-related family history includes " Breast cancer in her sister; Colon cancer in her paternal grandmother; Kidney cancer in her maternal uncle; Ovarian cancer in her maternal aunt; Pancreatic cancer in her paternal uncle.    Social History     Tobacco Use    Smoking status: Some Days     Packs/day: 0.25     Years: 26.00     Pack years: 6.50     Types: Cigarettes    Smokeless tobacco: Never    Tobacco comments:     Social drinker/smoker   Vaping Use    Vaping Use: Never used   Substance Use Topics    Alcohol use: Yes     Alcohol/week: 4.0 standard drinks     Types: 2 Cans of beer, 2 Shots of liquor per week     Comment: socially all of the above    Drug use: Never         I have reviewed and confirmed the accuracy of the patient's history: Chief complaint, HPI, ROS, and Subjective as entered by the MA/LPN/RN. Fifi Amaral MD 08/29/23        Subjective    Patient is here today to review her genetic results.      Review of Systems   Constitutional:  Negative for chills, fatigue and fever.   HENT:  Negative for congestion, drooling, ear discharge, rhinorrhea, sinus pressure and tinnitus.    Eyes:  Negative for photophobia, pain and discharge.   Respiratory:  Negative for apnea, choking and stridor.    Cardiovascular:  Negative for palpitations.   Gastrointestinal:  Negative for abdominal distention, abdominal pain and anal bleeding.   Endocrine: Negative for polydipsia and polyphagia.   Genitourinary:  Negative for decreased urine volume, flank pain and genital sores.   Musculoskeletal:  Negative for gait problem, neck pain and neck stiffness.   Skin:  Negative for color change, rash and wound.   Neurological:  Negative for tremors, seizures, syncope, facial asymmetry and speech difficulty.   Hematological:  Negative for adenopathy.   Psychiatric/Behavioral:  Negative for agitation, confusion, hallucinations and self-injury. The patient is not hyperactive.      Objective:    Vitals:    08/29/23 0936   BP: 139/80  Comment: Has not take BP Meds  "today   Pulse: 73   Resp: 18   Temp: 98 øF (36.7 øC)   TempSrc: Infrared   SpO2: 98%   Weight: 117 kg (258 lb)   Height: 182.9 cm (72\")   PainSc:   2   PainLoc: Back       (0) Fully active, able to carry on all predisease performance without restriction    Physical Exam  Vitals and nursing note reviewed.   Constitutional:       General: She is not in acute distress.     Appearance: She is not diaphoretic.   HENT:      Head: Normocephalic and atraumatic.   Eyes:      General: No scleral icterus.        Right eye: No discharge.         Left eye: No discharge.      Conjunctiva/sclera: Conjunctivae normal.   Neck:      Thyroid: No thyromegaly.   Cardiovascular:      Rate and Rhythm: Normal rate and regular rhythm.      Heart sounds: Normal heart sounds.     No friction rub. No gallop.   Pulmonary:      Effort: Pulmonary effort is normal. No respiratory distress.      Breath sounds: No stridor. No wheezing.   Abdominal:      General: Bowel sounds are normal.      Palpations: Abdomen is soft. There is no mass.      Tenderness: There is no abdominal tenderness. There is no guarding or rebound.   Musculoskeletal:         General: No tenderness. Normal range of motion.      Cervical back: Normal range of motion and neck supple.   Lymphadenopathy:      Cervical: No cervical adenopathy.   Skin:     General: Skin is warm.      Findings: No erythema or rash.   Neurological:      Mental Status: She is alert and oriented to person, place, and time.      Motor: No abnormal muscle tone.   Psychiatric:         Behavior: Behavior normal.       Assessment & Plan     There are no diagnoses linked to this encounter.    Strong family history of cancer  Post Genetic counseling and testing  Comprehensive gene analysis with "[employer] technology was negative for any significant mutation in all genes analyzed.    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  Her sister also had comprehensive gene test which was essentially negative for any mutation.    Additional family cancer history includes maternal aunt with ovarian cancer in her 60s, maternal uncle with kidney cancer, paternal great grandfather with pancreatic cancer and paternal great grandmother with colon canc      We have reviewed patient's results.  She has screened negative for any deleterious mutation that could increase her risk for developing cancer.  I explained to patient that the cancers that occurred in her family may have all been sporadic or could still be due to a mutation that we are not able to detect with the available technology.  There are other genes suspected to increase risk breast cancer that are yet to be identified.  About 5% to 10% of all breast cancers are due to genetic mutation, the rest are due to hormonal, reproductive, endocrinology and lifestyle issues.  Patient will continue to monitor her family cancer history and update us of any changes that occur in the future.  Her negative result could imply that even if there is a mutation in the family she may not have inherited it. We discussed the concept of familial breast cancer syndrome, which could play a role in her family cancer development.       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  Discussed that VUS:  Variant of unknown significance has been found on genetic test.  Based on currently available data it is unclear whether this VUS is pathogenic or benign variant.  80% of variant of unknown significance will return as benign, 20% will truly be pathogenic.  Therefore, I did recommend to patient that there is need to maintain contact information with us for updates on VUS reclassification.  At this point in  time, we cannot use variant of unknown significance to make any clinical decisions for patient and family members.  Clinical decisions will be based on personal history, family history and any positive pathogenic mutations identified.  We also discussed about variant reclassification program.  Patient has been given information to follow up with the genetic lab if there is interest to participate in this study, which may help to clarify this variant in the future.        Patient has screened to be high risk for breast cancer based on the Tyrer-Cuzick risk assessment tool or the Judy Model.    We discussed general breast health care such as increased breast awareness, monthly breast self- exams, six monthly clinical breast exam and annual mammograms.  We also discussed use of breast MRI for screening high-risk patients.    We discussed risk modifications such as limiting alcohol ingestion to one to two drinks per week, avoidance of smoking and avoidance of postmenopausal weight gain.  We discussed breast cancer risks associated with prolonged hormone replacement therapy.    We discussed signs and symptoms for patient to watch out for and notify us should they occur including breast lumps, nipple discharge, skin discoloration, breast pain or any other concerns she may have     We discussed chemoprophylaxis with antiestrogen, which has been shown in clinical studies to reduce risk of breast cancer as much as 50% in high risk women.  We discussed the side effects associated with these medicines including hot flashes, mood changes, and cardiovascular risk including strokes, heart attacks and vascular thrombosis.  Educational materials have been given to patient to review and  she will let me know how to proceed.    A copy of her genetic results have been given to patient for her own records keeping.  I will plan to see her again in six months for her breast exam and scheduling of her imaging .  Patient has been given  opportunities to ask questions, which have all been answered to the best of abilities.                 Plans:      Patient was given copies of her genetic results for her own records keeping  She will schedule routine colonoscopy  Will need annual GYN exam including transvaginal ultrasound due to family history of ovarian cancer as well as   She will proceed with scheduling her mammogram which is due October 2023  Follow-up with me in 6 months for breast exam and schedule of bilateral breast MRI  She will continue to monitor her family cancer history and update us of any changes  She was given information on tamoxifen to review  I have requested for [facility]  All questions answered      I spent 40 total minutes, face-to-face, caring for Мария today. 90% of this time involved counseling and/or coordination of care as documented within this note.

## 2023-08-29 ENCOUNTER — OFFICE VISIT (OUTPATIENT)
Dept: ONCOLOGY | Facility: CLINIC | Age: 48
End: 2023-08-29
Payer: MEDICAID

## 2023-08-29 VITALS
HEIGHT: 72 IN | TEMPERATURE: 98 F | RESPIRATION RATE: 18 BRPM | OXYGEN SATURATION: 98 % | SYSTOLIC BLOOD PRESSURE: 139 MMHG | WEIGHT: 258 LBS | HEART RATE: 73 BPM | DIASTOLIC BLOOD PRESSURE: 80 MMHG | BODY MASS INDEX: 34.95 KG/M2

## 2023-08-29 DIAGNOSIS — Z80.9 FAMILY HISTORY OF CANCER: Primary | ICD-10-CM

## 2023-08-29 NOTE — LETTER
August 29, 2023     ROBINA Stokes  [address] [facility] [place] [address]    Patient: Мария Fajardo   YOB: 1975   Date of Visit: 8/29/2023     Dear ROBINA Stokes:       Thank you for referring Мария Fajardo to me for evaluation. Below are the relevant portions of my assessment and plan of care.    If you have questions, please do not hesitate to call me. I look forward to following Мария along with you.         Sincerely,        Fifi Amaral MD        CC: No Recipients    Fifi Amaral MD  08/29/23 1358  Sign when Signing Visit  Genetic Note    Мария Fajardo  1975    Primary Care Physician: Salome Mancuso APRN  Referring Physician: Salome Mancuso APRN      Reason For Visit: High Risk Evaluation For  Cancer      Chief Complaint   Patient presents with    Follow-up     Family history of Cancer       HPI    This is a 48 year old female with no history of cancer.  Patient is here today due to strong family history of cancer.  Recently her sister was diagnosed with breast cancer in her 50s.  Her sister also had comprehensive gene test which was essentially negative for any mutation.    Additional family cancer history includes maternal aunt with ovarian cancer in her 60s, maternal uncle with kidney cancer, paternal great grandfather with pancreatic cancer and paternal great grandmother with colon cancer.    Patient is up-to-date on her annual mammogram    She had her last mammogram in October 2022 at [facility] radiology.  She has history of dense breast tissue    No hx of breast bx  She has dense breast  Patient smokes 10 cigs per week  She does drink socially  She is nulliparous  Menrache  was at 10 years.  She is premenopausal     Patient is an     Patient smokes about 10 cigarettes/day and drinks alcohol socially    7/12/2023: Patient had comprehensive gene analysis with [employer] technology which returned with no evidence of germline  mutation in all 77 genes analyzed.  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    Past Medical History:   Diagnosis Date    Asthma 10/28/2015    Closed right ankle fracture 2019    no surgery    COVID 11/2021    Elbow fracture, right     no surgery    Hyperlipidemia December 2022    Hypertension Recently started taking medications    Left patella fracture     as a kid;no surgery    Sleep apnea July 2022       Past Surgical History:   Procedure Laterality Date    NO PAST SURGERIES           Current Outpatient Medications:     atorvastatin (LIPITOR) 10 MG tablet, Take 1 tablet by mouth Daily., Disp: , Rfl:     cetirizine (zyrTEC) 10 MG tablet, Take 1 tablet by mouth Daily., Disp: , Rfl:     cyclobenzaprine (FLEXERIL) 5 MG tablet, Take 1 tablet by mouth 3 (Three) Times a Day As Needed for Muscle Spasms., Disp: 15 tablet, Rfl: 0    fluticasone (FLONASE) 50 MCG/ACT nasal spray, 2 sprays into the nostril(s) as directed by provider Daily., Disp: , Rfl:     hydroCHLOROthiazide (HYDRODIURIL) 25 MG tablet, Take 1 tablet by mouth Daily., Disp: , Rfl:     Magnesium 100 MG capsule, Take  by mouth., Disp: , Rfl:     meloxicam (MOBIC) 15 MG tablet, Take 1 tablet by mouth Daily., Disp: 10 tablet, Rfl: 0    metFORMIN (GLUCOPHAGE) 500 MG tablet, Take 1 tablet by mouth 2 (Two) Times a Day With Meals., Disp: , Rfl:     metoprolol succinate XL (TOPROL-XL) 25 MG 24 hr tablet, Take 1 tablet by mouth Daily., Disp: , Rfl:     metroNIDAZOLE (METROGEL) 0.75 % vaginal gel, 1 (ONE) APPLICATOR NIGHTLY, ONE FULL APPLICATOR INTRAVAGINALLY EVERY DAY FOR 5 DAYS, Disp: , Rfl:     multivitamin with minerals (MULTIVITAMIN ADULT PO), Take 1 tablet by mouth Daily., Disp: , Rfl:     Ozempic, 0.25 or 0.5 MG/DOSE, 2 MG/1.5ML solution pen-injector, INJECT 0.25MG SUBCUTANEOUSLY WEEKLY FOR 4 WEEKS. FOR PREDIABETES/OBESITY, Disp: , Rfl:     pantoprazole (PROTONIX) 40 MG EC tablet, Take 1 tablet by mouth  "Daily., Disp: , Rfl:     Potassium 75 MG tablet, Take  by mouth As Needed. For \"leg cramps\", Disp: , Rfl:     Turmeric 500 MG capsule, Take  by mouth., Disp: , Rfl:     No Known Allergies    Family History   Problem Relation Age of Onset    Hypertension Mother         High Blood Pressure    Hypertension Father         High Blood Pressure    Diabetes Father     Heart disease Father     Fainting Sister     Breast cancer Sister     Ovarian cancer Maternal Aunt     Kidney cancer Maternal Uncle     Pancreatic cancer Paternal Uncle     Colon cancer Paternal Grandmother        Cancer-related family history includes Breast cancer in her sister; Colon cancer in her paternal grandmother; Kidney cancer in her maternal uncle; Ovarian cancer in her maternal aunt; Pancreatic cancer in her paternal uncle.    Social History     Tobacco Use    Smoking status: Some Days     Packs/day: 0.25     Years: 26.00     Pack years: 6.50     Types: Cigarettes    Smokeless tobacco: Never    Tobacco comments:     Social drinker/smoker   Vaping Use    Vaping Use: Never used   Substance Use Topics    Alcohol use: Yes     Alcohol/week: 4.0 standard drinks     Types: 2 Cans of beer, 2 Shots of liquor per week     Comment: socially all of the above    Drug use: Never         I have reviewed and confirmed the accuracy of the patient's history: Chief complaint, HPI, ROS, and Subjective as entered by the MA/LPN/RN. Fifi Amaral MD 08/29/23        Subjective    Patient is here today to review her genetic results.      Review of Systems   Constitutional:  Negative for chills, fatigue and fever.   HENT:  Negative for congestion, drooling, ear discharge, rhinorrhea, sinus pressure and tinnitus.    Eyes:  Negative for photophobia, pain and discharge.   Respiratory:  Negative for apnea, choking and stridor.    Cardiovascular:  Negative for palpitations.   Gastrointestinal:  Negative for abdominal distention, abdominal pain and anal " "bleeding.   Endocrine: Negative for polydipsia and polyphagia.   Genitourinary:  Negative for decreased urine volume, flank pain and genital sores.   Musculoskeletal:  Negative for gait problem, neck pain and neck stiffness.   Skin:  Negative for color change, rash and wound.   Neurological:  Negative for tremors, seizures, syncope, facial asymmetry and speech difficulty.   Hematological:  Negative for adenopathy.   Psychiatric/Behavioral:  Negative for agitation, confusion, hallucinations and self-injury. The patient is not hyperactive.      Objective:    Vitals:    08/29/23 0936   BP: 139/80  Comment: Has not take BP Meds today   Pulse: 73   Resp: 18   Temp: 98 øF (36.7 øC)   TempSrc: Infrared   SpO2: 98%   Weight: 117 kg (258 lb)   Height: 182.9 cm (72\")   PainSc:   2   PainLoc: Back       (0) Fully active, able to carry on all predisease performance without restriction    Physical Exam  Vitals and nursing note reviewed.   Constitutional:       General: She is not in acute distress.     Appearance: She is not diaphoretic.   HENT:      Head: Normocephalic and atraumatic.   Eyes:      General: No scleral icterus.        Right eye: No discharge.         Left eye: No discharge.      Conjunctiva/sclera: Conjunctivae normal.   Neck:      Thyroid: No thyromegaly.   Cardiovascular:      Rate and Rhythm: Normal rate and regular rhythm.      Heart sounds: Normal heart sounds.     No friction rub. No gallop.   Pulmonary:      Effort: Pulmonary effort is normal. No respiratory distress.      Breath sounds: No stridor. No wheezing.   Abdominal:      General: Bowel sounds are normal.      Palpations: Abdomen is soft. There is no mass.      Tenderness: There is no abdominal tenderness. There is no guarding or rebound.   Musculoskeletal:         General: No tenderness. Normal range of motion.      Cervical back: Normal range of motion and neck supple.   Lymphadenopathy:      Cervical: No cervical adenopathy.   Skin:     General: " Skin is warm.      Findings: No erythema or rash.   Neurological:      Mental Status: She is alert and oriented to person, place, and time.      Motor: No abnormal muscle tone.   Psychiatric:         Behavior: Behavior normal.       Assessment & Plan     There are no diagnoses linked to this encounter.    Strong family history of cancer  Post Genetic counseling and testing  Comprehensive gene analysis with [employer] was negative for any significant mutation in all genes analyzed.    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  Her sister also had comprehensive gene test which was essentially negative for any mutation.    Additional family cancer history includes maternal aunt with ovarian cancer in her 60s, maternal uncle with kidney cancer, paternal great grandfather with pancreatic cancer and paternal great grandmother with colon canc      We have reviewed patient's results.  She has screened negative for any deleterious mutation that could increase her risk for developing cancer.  I explained to patient that the cancers that occurred in her family may have all been sporadic or could still be due to a mutation that we are not able to detect with the available technology.  There are other genes suspected to increase risk breast cancer that are yet to be identified.  About 5% to 10% of all breast cancers are due to genetic mutation, the rest are due to hormonal, reproductive, endocrinology and lifestyle issues.  Patient will continue to monitor her family cancer history and update us of any changes that occur in the future.  Her negative result could imply that even if there is a mutation in the family she may not have inherited it.  We discussed the concept of familial breast cancer syndrome, which could play a role in her family cancer development.       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  Discussed that VUS:  Variant of unknown significance has been found on genetic test.  Based on currently available data it is unclear whether this VUS is pathogenic or benign variant.  80% of variant of unknown significance will return as benign, 20% will truly be pathogenic.  Therefore, I did recommend to patient that there is need to maintain contact information with us for updates on VUS reclassification.  At this point in time, we cannot use variant of unknown significance to make any clinical decisions for patient and family members.  Clinical decisions will be based on personal history, family history and any positive pathogenic mutations identified.  We also discussed about variant reclassification program.  Patient has been given information to follow up with the genetic lab if there is interest to participate in this study, which may help to clarify this variant in the future.        Patient has screened to be high risk for breast cancer based on the Tyrer-Cuzick risk assessment tool or the Judy Model.    We discussed general breast health care such as increased breast awareness, monthly breast self- exams, six monthly clinical breast exam and annual mammograms.  We also discussed use of breast MRI for screening high-risk patients.    We discussed risk modifications such as limiting alcohol ingestion to one to two drinks per week, avoidance of smoking and avoidance of postmenopausal weight gain.  We discussed breast cancer risks associated with prolonged hormone replacement therapy.    We discussed signs and symptoms for patient to watch out for and notify us should they occur including breast lumps, nipple discharge, skin discoloration, breast pain or any other concerns she may have     We discussed  chemoprophylaxis with antiestrogen, which has been shown in clinical studies to reduce risk of breast cancer as much as 50% in high risk women.  We discussed the side effects associated with these medicines including hot flashes, mood changes, and cardiovascular risk including strokes, heart attacks and vascular thrombosis.  Educational materials have been given to patient to review and  she will let me know how to proceed.    A copy of her genetic results have been given to patient for her own records keeping.  I will plan to see her again in six months for her breast exam and scheduling of her imaging .  Patient has been given opportunities to ask questions, which have all been answered to the best of abilities.                 Plans:      Patient was given copies of her genetic results for her own records keeping  She will schedule routine colonoscopy  Will need annual GYN exam including transvaginal ultrasound due to family history of ovarian cancer as well as   She will proceed with scheduling her mammogram which is due October 2023  Follow-up with me in 6 months for breast exam and schedule of bilateral breast MRI  She will continue to monitor her family cancer history and update us of any changes  She was given information on tamoxifen to review  I have requested for [facility]  All questions answered      I spent 40 total minutes, face-to-face, caring for Мария today. 90% of this time involved counseling and/or coordination of care as documented within this note.

## 2023-08-29 NOTE — PATIENT INSTRUCTIONS
Nolvadex - Tamoxifen Tablets  What is this medication?  TAMOXIFEN (ta MOX i fen) prevents and treats breast cancer. It works by blocking the hormone estrogen in breast tissue, which prevents breast cancer cells from spreading or growing.    This medicine may be used for other purposes; ask your health care provider or pharmacist if you have questions.    COMMON BRAND NAME(S): Nolvadex    What should I tell my care team before I take this medication?  They need to know if you have any of these conditions:    Blood clots  Endometriosis  High cholesterol  Irregular menstrual cycles  Liver disease  Stroke  Uterine cancer  Uterine fibroids  An unusual reaction to tamoxifen, other medications, foods, dyes, or preservatives  Pregnant or trying to get pregnant  Breast-feeding  How should I use this medication?  Take this medication by mouth. Take it as directed on the prescription label at the same time every day. You can take it with or without food. If it upsets your stomach, take it with food. Keep taking it unless your care team tells you to stop.    A special MedGuide will be given to you by the pharmacist with each prescription and refill. Be sure to read this information carefully each time.    Talk to your care team about the use of this medication in children. Special care may be needed.    Overdosage: If you think you have taken too much of this medicine contact a poison control center or emergency room at once.    NOTE: This medicine is only for you. Do not share this medicine with others.    What if I miss a dose?  If you miss a dose, take it as soon as you can. If it is almost time for your next dose, take only that dose. Do not take double or extra doses.    What may interact with this medication?  Do not take this medication with any of the following:    Cisapride  Dronedarone  Pimozide  Thioridazine  This medication may also interact with the following:    Anastrozole  Certain medications for seizures like  carbamazepine, phenobarbital, phenytoin  Letrozole  Other medications that prolong the QT interval  Paroxetine  Rifampin  Warfarin  This list may not describe all possible interactions. Give your health care provider a list of all the medicines, herbs, non-prescription drugs, or dietary supplements you use. Also tell them if you smoke, drink alcohol, or use illegal drugs. Some items may interact with your medicine.    What should I watch for while using this medication?  Visit your care team for regular checks on your progress. You will need regular pelvic exams, breast exams, and mammograms.    Talk to your care team about your risk of uterine cancer. You may be more at risk for uterine cancer if you take this medication.    Do not become pregnant while taking this medication or for 2 months after stopping it. Women should inform their care team if they wish to become pregnant or think they might be pregnant. There is a potential for serious harm to an unborn child. Talk to your care team for more information. Do not breast-feed an infant while taking this medication or for 3 months after stopping it.    This medication may make it more difficult to get pregnant. Talk with your care team if you are concerned about your fertility.    What side effects may I notice from receiving this medication?  Side effects that you should report to your care team as soon as possible:    Allergic reactions--skin rash, itching, hives, swelling of the face, lips, tongue, or throat  Blood clot--pain, swelling, or warmth in the leg, shortness of breath, chest pain  High calcium level--increased thirst or amount of urine, nausea, vomiting, confusion, unusual weakness or fatigue, bone pain  Infection--fever, chills, cough, or sore throat  Irregular menstrual cycles or spotting  Liver injury--right upper belly pain, loss of appetite, nausea, light-colored stool, dark yellow or brown urine, yellowing skin or eyes, unusual weakness or  fatigue  Low red blood cell count--unusual weakness or fatigue, dizziness, headache, trouble breathing  Stroke--sudden numbness or weakness in the face, arm, or leg, trouble speaking, confusion, trouble walking, loss of balance or coordination, dizziness, severe headache, change in vision  Unusual bruising or bleeding  Vaginal bleeding after menopause, pelvic pain  Side effects that usually do not require medical attention (report to your care team if they continue or are bothersome):    Hot flashes  Mood swings  Vaginal discharge  This list may not describe all possible side effects. Call your doctor for medical advice about side effects. You may report side effects to FDA at 8-198-ABD-6016.    Where should I keep my medication?  Keep out of the reach of children and pets.    Store at room temperature between 20 and 25 degrees C (68 and 77 degrees F). Protect from light. Get rid of any unused medication after the expiration date.    To get rid of medications that are no longer needed or have :    Take the medication to a medication take-back program. Check with your pharmacy or law enforcement to find a location.  If you cannot return the medication, check the label or package insert to see if the medication should be thrown out in the garbage or flushed down the toilet. If you are not sure, ask your care team. If it is safe to put it in the trash, empty the medication out of the container. Mix the medication with cat litter, dirt, coffee grounds, or other unwanted substance. Seal the mixture in a bag or container. Put it in the trash.  NOTE: This sheet is a summary. It may not cover all possible information. If you have questions about this medicine, talk to your doctor, pharmacist, or health care provider.    c  Elsevier/Gold Standard (2022 00:00:00)    Additional Information From [employer] About Nolvadex  Self-Care Tips:  Do not stop taking this medication unless your healthcare provider  tells you. You may be on it for as long as 5 years.   If you are experiencing hot flashes, wearing light clothing, staying in a cool environment, and putting cool cloths on your head may reduce symptoms. Consult you health care provider if these worsen, or become intolerable  This medication causes little nausea.  But if you should experience nausea, take anti-nausea medications as prescribed by your doctor, and eat small frequent meals.  Sucking on lozenges and chewing gum may also help.   Avoid sun exposure.  Wear SPF 15 (or higher) sunblock and protective clothing.  In general, drinking alcoholic beverages should be kept to a minimum or avoided completely.  You should discuss this with your doctor.  Get plenty of rest.   Maintain good nutrition.  If you experience symptoms or side effects, be sure to discuss them with your health care team.  They can prescribe medications and/or offer other suggestions that are effective in managing such problems.    When to contact your doctor or health care provider:  Seek emergency help immediately and notify your health care provider, it you experience the following symptoms:    Sudden shortness of breath and/or chest pain  The following symptoms require medical attention, but are not an emergency.  Contact your health care provider within 24 hours of noticing any of the following:    Swelling, redness and/or pain in one leg or arm and not the other  New breast lumps  Excessive vaginal discharge or bleeding, menstrual (period) pain or irregularities  Nausea (interferes with ability to eat and unrelieved with prescribed medication)  Depression (interfering with your ability to carry on your regular activities)  Changes in vision  Always inform your health care provider if you experience any unusual symptoms.

## 2023-11-08 ENCOUNTER — APPOINTMENT (OUTPATIENT)
Dept: CT IMAGING | Facility: HOSPITAL | Age: 48
End: 2023-11-08
Payer: MEDICAID

## 2023-11-08 ENCOUNTER — HOSPITAL ENCOUNTER (EMERGENCY)
Facility: HOSPITAL | Age: 48
Discharge: HOME | End: 2023-11-08
Attending: EMERGENCY MEDICINE | Admitting: EMERGENCY MEDICINE
Payer: MEDICAID

## 2023-11-08 VITALS
WEIGHT: 262.35 LBS | HEART RATE: 70 BPM | SYSTOLIC BLOOD PRESSURE: 156 MMHG | BODY MASS INDEX: 35.53 KG/M2 | RESPIRATION RATE: 18 BRPM | DIASTOLIC BLOOD PRESSURE: 91 MMHG | HEIGHT: 72 IN | OXYGEN SATURATION: 98 % | TEMPERATURE: 98 F

## 2023-11-08 DIAGNOSIS — K85.30 DRUG-INDUCED ACUTE PANCREATITIS WITHOUT INFECTION OR NECROSIS: Primary | ICD-10-CM

## 2023-11-08 LAB
ALBUMIN SERPL-MCNC: 3.8 G/DL (ref 3.5–5.2)
ALBUMIN/GLOB SERPL: 1.2 G/DL
ALP SERPL-CCNC: 71 U/L (ref 39–117)
ALT SERPL W P-5'-P-CCNC: 15 U/L (ref 1–33)
ANION GAP SERPL CALCULATED.3IONS-SCNC: 10 MMOL/L (ref 5–15)
AST SERPL-CCNC: 12 U/L (ref 1–32)
BACTERIA UR QL AUTO: ABNORMAL /HPF
BASOPHILS # BLD AUTO: 0 10*3/MM3 (ref 0–0.2)
BASOPHILS NFR BLD AUTO: 0.5 % (ref 0–1.5)
BILIRUB SERPL-MCNC: 0.4 MG/DL (ref 0–1.2)
BILIRUB UR QL STRIP: NEGATIVE
BUN SERPL-MCNC: 10 MG/DL (ref 6–20)
BUN/CREAT SERPL: 13 (ref 7–25)
CALCIUM SPEC-SCNC: 9.1 MG/DL (ref 8.6–10.5)
CHLORIDE SERPL-SCNC: 103 MMOL/L (ref 98–107)
CLARITY UR: CLEAR
CO2 SERPL-SCNC: 24 MMOL/L (ref 22–29)
COLOR UR: YELLOW
CREAT SERPL-MCNC: 0.77 MG/DL (ref 0.57–1)
DEPRECATED RDW RBC AUTO: 43.3 FL (ref 37–54)
EGFRCR SERPLBLD CKD-EPI 2021: 94.7 ML/MIN/1.73
EOSINOPHIL # BLD AUTO: 0.3 10*3/MM3 (ref 0–0.4)
EOSINOPHIL NFR BLD AUTO: 3.3 % (ref 0.3–6.2)
ERYTHROCYTE [DISTWIDTH] IN BLOOD BY AUTOMATED COUNT: 14.2 % (ref 12.3–15.4)
GLOBULIN UR ELPH-MCNC: 3.2 GM/DL
GLUCOSE SERPL-MCNC: 136 MG/DL (ref 65–99)
GLUCOSE UR STRIP-MCNC: NEGATIVE MG/DL
HCT VFR BLD AUTO: 43.8 % (ref 34–46.6)
HGB BLD-MCNC: 14.5 G/DL (ref 12–15.9)
HGB UR QL STRIP.AUTO: NEGATIVE
HYALINE CASTS UR QL AUTO: ABNORMAL /LPF
KETONES UR QL STRIP: NEGATIVE
LEUKOCYTE ESTERASE UR QL STRIP.AUTO: ABNORMAL
LIPASE SERPL-CCNC: 500 U/L (ref 13–60)
LYMPHOCYTES # BLD AUTO: 1.7 10*3/MM3 (ref 0.7–3.1)
LYMPHOCYTES NFR BLD AUTO: 17.9 % (ref 19.6–45.3)
MCH RBC QN AUTO: 28.7 PG (ref 26.6–33)
MCHC RBC AUTO-ENTMCNC: 33.1 G/DL (ref 31.5–35.7)
MCV RBC AUTO: 86.6 FL (ref 79–97)
MONOCYTES # BLD AUTO: 0.6 10*3/MM3 (ref 0.1–0.9)
MONOCYTES NFR BLD AUTO: 5.9 % (ref 5–12)
NEUTROPHILS NFR BLD AUTO: 7 10*3/MM3 (ref 1.7–7)
NEUTROPHILS NFR BLD AUTO: 72.4 % (ref 42.7–76)
NITRITE UR QL STRIP: NEGATIVE
NRBC BLD AUTO-RTO: 0 /100 WBC (ref 0–0.2)
PH UR STRIP.AUTO: <=5 [PH] (ref 5–8)
PLATELET # BLD AUTO: 269 10*3/MM3 (ref 140–450)
PMV BLD AUTO: 8.7 FL (ref 6–12)
POTASSIUM SERPL-SCNC: 4.1 MMOL/L (ref 3.5–5.2)
PROT SERPL-MCNC: 7 G/DL (ref 6–8.5)
PROT UR QL STRIP: NEGATIVE
RBC # BLD AUTO: 5.05 10*6/MM3 (ref 3.77–5.28)
RBC # UR STRIP: ABNORMAL /HPF
REF LAB TEST METHOD: ABNORMAL
SODIUM SERPL-SCNC: 137 MMOL/L (ref 136–145)
SP GR UR STRIP: 1.03 (ref 1–1.03)
SQUAMOUS #/AREA URNS HPF: ABNORMAL /HPF
UROBILINOGEN UR QL STRIP: ABNORMAL
WBC # UR STRIP: ABNORMAL /HPF
WBC NRBC COR # BLD: 9.7 10*3/MM3 (ref 3.4–10.8)

## 2023-11-08 PROCEDURE — 25010000002 KETOROLAC TROMETHAMINE PER 15 MG: Performed by: EMERGENCY MEDICINE

## 2023-11-08 PROCEDURE — 85025 COMPLETE CBC W/AUTO DIFF WBC: CPT | Performed by: EMERGENCY MEDICINE

## 2023-11-08 PROCEDURE — 83690 ASSAY OF LIPASE: CPT | Performed by: EMERGENCY MEDICINE

## 2023-11-08 PROCEDURE — 80053 COMPREHEN METABOLIC PANEL: CPT | Performed by: EMERGENCY MEDICINE

## 2023-11-08 PROCEDURE — 99285 EMERGENCY DEPT VISIT HI MDM: CPT

## 2023-11-08 PROCEDURE — 74177 CT ABD & PELVIS W/CONTRAST: CPT

## 2023-11-08 PROCEDURE — 25510000001 IOPAMIDOL PER 1 ML: Performed by: EMERGENCY MEDICINE

## 2023-11-08 PROCEDURE — 81001 URINALYSIS AUTO W/SCOPE: CPT | Performed by: EMERGENCY MEDICINE

## 2023-11-08 PROCEDURE — 87086 URINE CULTURE/COLONY COUNT: CPT | Performed by: EMERGENCY MEDICINE

## 2023-11-08 PROCEDURE — 96374 THER/PROPH/DIAG INJ IV PUSH: CPT

## 2023-11-08 RX ORDER — OXYCODONE HYDROCHLORIDE AND ACETAMINOPHEN 5; 325 MG/1; MG/1
1 TABLET ORAL EVERY 6 HOURS PRN
Qty: 12 TABLET | Refills: 0 | Status: SHIPPED | OUTPATIENT
Start: 2023-11-08

## 2023-11-08 RX ORDER — KETOROLAC TROMETHAMINE 15 MG/ML
15 INJECTION, SOLUTION INTRAMUSCULAR; INTRAVENOUS ONCE
Status: COMPLETED | OUTPATIENT
Start: 2023-11-08 | End: 2023-11-08

## 2023-11-08 RX ORDER — SODIUM CHLORIDE 0.9 % (FLUSH) 0.9 %
10 SYRINGE (ML) INJECTION AS NEEDED
Status: DISCONTINUED | OUTPATIENT
Start: 2023-11-08 | End: 2023-11-08 | Stop reason: HOSPADM

## 2023-11-08 RX ORDER — ONDANSETRON 4 MG/1
4 TABLET, ORALLY DISINTEGRATING ORAL EVERY 8 HOURS PRN
Qty: 10 TABLET | Refills: 0 | Status: SHIPPED | OUTPATIENT
Start: 2023-11-08

## 2023-11-08 RX ADMIN — IOPAMIDOL 100 ML: 755 INJECTION, SOLUTION INTRAVENOUS at 06:06

## 2023-11-08 RX ADMIN — KETOROLAC TROMETHAMINE 15 MG: 15 INJECTION, SOLUTION INTRAMUSCULAR; INTRAVENOUS at 07:25

## 2023-11-08 NOTE — DISCHARGE INSTRUCTIONS
Follow-up with your primary doctor.  Return to the emergency room for any new or worsening symptoms or if you have any other questions or concerns.  Take medication as prescribed.  Do not drive or drink alcohol while taking pain medication.  Start with a clear liquid diet over the next couple days and slowly advance as tolerated.  Do not take Ozempic until follow-up with your primary provider.

## 2023-11-09 LAB — BACTERIA SPEC AEROBE CULT: NORMAL
